# Patient Record
Sex: FEMALE | Race: WHITE | NOT HISPANIC OR LATINO | URBAN - METROPOLITAN AREA
[De-identification: names, ages, dates, MRNs, and addresses within clinical notes are randomized per-mention and may not be internally consistent; named-entity substitution may affect disease eponyms.]

---

## 2024-03-22 ENCOUNTER — HOSPITAL ENCOUNTER (EMERGENCY)
Facility: HOSPITAL | Age: 64
Discharge: HOME | End: 2024-03-22
Attending: GENERAL PRACTICE
Payer: MEDICARE

## 2024-03-22 VITALS
OXYGEN SATURATION: 98 % | DIASTOLIC BLOOD PRESSURE: 85 MMHG | SYSTOLIC BLOOD PRESSURE: 141 MMHG | RESPIRATION RATE: 18 BRPM | HEIGHT: 65 IN | TEMPERATURE: 98.7 F | HEART RATE: 68 BPM | BODY MASS INDEX: 36.99 KG/M2 | WEIGHT: 222 LBS

## 2024-03-22 DIAGNOSIS — H10.32 ACUTE CONJUNCTIVITIS OF LEFT EYE, UNSPECIFIED ACUTE CONJUNCTIVITIS TYPE: Primary | ICD-10-CM

## 2024-03-22 PROCEDURE — 2500000001 HC RX 250 WO HCPCS SELF ADMINISTERED DRUGS (ALT 637 FOR MEDICARE OP): Performed by: GENERAL PRACTICE

## 2024-03-22 PROCEDURE — 99283 EMERGENCY DEPT VISIT LOW MDM: CPT

## 2024-03-22 RX ORDER — IBUPROFEN 600 MG/1
600 TABLET ORAL ONCE
Status: COMPLETED | OUTPATIENT
Start: 2024-03-22 | End: 2024-03-22

## 2024-03-22 RX ORDER — TETRACAINE HYDROCHLORIDE 5 MG/ML
2 SOLUTION OPHTHALMIC ONCE
Status: COMPLETED | OUTPATIENT
Start: 2024-03-22 | End: 2024-03-22

## 2024-03-22 RX ORDER — ERYTHROMYCIN 5 MG/G
OINTMENT OPHTHALMIC EVERY 6 HOURS
Qty: 10 G | Refills: 0 | Status: SHIPPED | OUTPATIENT
Start: 2024-03-22 | End: 2024-03-27

## 2024-03-22 RX ADMIN — FLUORESCEIN SODIUM 1 STRIP: 1 STRIP OPHTHALMIC at 17:10

## 2024-03-22 RX ADMIN — IBUPROFEN 600 MG: 600 TABLET, FILM COATED ORAL at 17:56

## 2024-03-22 RX ADMIN — TETRACAINE HYDROCHLORIDE 2 DROP: 5 SOLUTION OPHTHALMIC at 17:10

## 2024-03-22 ASSESSMENT — COLUMBIA-SUICIDE SEVERITY RATING SCALE - C-SSRS
6. HAVE YOU EVER DONE ANYTHING, STARTED TO DO ANYTHING, OR PREPARED TO DO ANYTHING TO END YOUR LIFE?: NO
1. IN THE PAST MONTH, HAVE YOU WISHED YOU WERE DEAD OR WISHED YOU COULD GO TO SLEEP AND NOT WAKE UP?: NO
2. HAVE YOU ACTUALLY HAD ANY THOUGHTS OF KILLING YOURSELF?: NO

## 2024-03-22 ASSESSMENT — PAIN SCALES - GENERAL
PAINLEVEL_OUTOF10: 4
PAINLEVEL_OUTOF10: 0 - NO PAIN
PAINLEVEL_OUTOF10: 4

## 2024-03-22 ASSESSMENT — PAIN - FUNCTIONAL ASSESSMENT: PAIN_FUNCTIONAL_ASSESSMENT: 0-10

## 2024-03-22 NOTE — ED TRIAGE NOTES
PT TO ED FROM HOME WITH C/O LEFT EYE REDNESS, EDEMA AND DRAINAGE FOR 4 DAYS. PT SEEN AT [facility] AND GIVEN ABX DROPS WITH NO IMPROVEMENT.

## 2024-03-24 ENCOUNTER — HOSPITAL ENCOUNTER (EMERGENCY)
Facility: HOSPITAL | Age: 64
Discharge: HOME | End: 2024-03-24
Attending: EMERGENCY MEDICINE
Payer: MEDICARE

## 2024-03-24 VITALS
SYSTOLIC BLOOD PRESSURE: 140 MMHG | HEIGHT: 65 IN | HEART RATE: 82 BPM | DIASTOLIC BLOOD PRESSURE: 93 MMHG | WEIGHT: 222 LBS | RESPIRATION RATE: 18 BRPM | TEMPERATURE: 98.4 F | BODY MASS INDEX: 36.99 KG/M2 | OXYGEN SATURATION: 95 %

## 2024-03-24 DIAGNOSIS — B30.9 VIRAL CONJUNCTIVITIS OF BOTH EYES: Primary | ICD-10-CM

## 2024-03-24 PROCEDURE — 99284 EMERGENCY DEPT VISIT MOD MDM: CPT

## 2024-03-24 PROCEDURE — 2500000001 HC RX 250 WO HCPCS SELF ADMINISTERED DRUGS (ALT 637 FOR MEDICARE OP)

## 2024-03-24 PROCEDURE — 99285 EMERGENCY DEPT VISIT HI MDM: CPT

## 2024-03-24 RX ADMIN — FLUORESCEIN SODIUM 2 STRIP: 1 STRIP OPHTHALMIC at 15:20

## 2024-03-24 ASSESSMENT — PAIN - FUNCTIONAL ASSESSMENT: PAIN_FUNCTIONAL_ASSESSMENT: 0-10

## 2024-03-24 ASSESSMENT — COLUMBIA-SUICIDE SEVERITY RATING SCALE - C-SSRS
6. HAVE YOU EVER DONE ANYTHING, STARTED TO DO ANYTHING, OR PREPARED TO DO ANYTHING TO END YOUR LIFE?: NO
2. HAVE YOU ACTUALLY HAD ANY THOUGHTS OF KILLING YOURSELF?: NO
1. IN THE PAST MONTH, HAVE YOU WISHED YOU WERE DEAD OR WISHED YOU COULD GO TO SLEEP AND NOT WAKE UP?: NO

## 2024-03-24 ASSESSMENT — VISUAL ACUITY
OS: 20/40
OU: 1
OU: 20/20
OD: 20/30

## 2024-03-24 ASSESSMENT — PAIN SCALES - GENERAL: PAINLEVEL_OUTOF10: 5 - MODERATE PAIN

## 2024-03-24 ASSESSMENT — TONOMETRY
OD_IOP_MMHG: 17
OS_IOP_MMHG: 19

## 2024-03-24 NOTE — DISCHARGE INSTRUCTIONS
Continue using Polytrim drops and erythromycin as prescribed.    Apply artificial tears every 2 hours to both eyes.  May apply cool compresses.  Ophthalmology services will follow-up with scheduled appointment information.  Return to the emergency department if symptoms worsen or new symptoms presents.

## 2024-03-24 NOTE — ED PROVIDER NOTES
Emergency Department Encounter  [facility] EMERGENCY MEDICINE    Patient: Marie Ray  MRN: [id number]  : 1960  Date of Evaluation: 3/24/2024  ED Provider: BISHOP Akbar      Chief Complaint       Chief Complaint   Patient presents with    Eye Pain     [place]    (Location/Symptom, Timing/Onset, Context/Setting, Quality, Duration, Modifying Factors, Severity) Note limiting factors.   Limitations to History: None  Historian: Patient  Records reviewed: EMR inpatient and outpatient notes, Care Everywhere      Marie Ray is a 63 y.o. female who presents to the emergency department complaining of progressively worsening redness and pain to bilateral eyes.  She states on Monday symptoms occurred, started having redness of the left eye, the next day started having tearing and pain.  She was evaluated at an urgent care and prescribed Polytrim, after 2 days, symptoms worsen, she  was then evaluated at [facility] on Friday, and prescribed erythromycin and declined to be transferred to [facility] for ophthalmology consult.  She expresses she believes may be coming down with a cold.  She denies vision loss, changes in vision, foreign body, acute injury, purulent drainage.    ROS:     Review of Systems  14 systems reviewed and otherwise acutely negative except as in the [place].          Past History     Past Medical History:   Diagnosis Date    Personal history of other mental and behavioral disorders 2013    History of depression     Past Surgical History:   Procedure Laterality Date    APPENDECTOMY  2014    Appendectomy    CHOLECYSTECTOMY  2014    Cholecystectomy     Social History     Socioeconomic History    Marital status:      Spouse name: None    Number of children: None    Years of education: None    Highest education level: None   Occupational History    None   Tobacco Use    Smoking status: None    Smokeless tobacco: None   Substance and Sexual Activity    Alcohol use:  None    Drug use: None    Sexual activity: None   Other Topics Concern    None   Social History Narrative    None     Social Determinants of Health     Financial Resource Strain: Not on file   Food Insecurity: Not on file   Transportation Needs: Not on file   Physical Activity: Not on file   Stress: Not on file   Social Connections: Not on file   Intimate Partner Violence: Not on file   Housing Stability: Not on file       Medications/Allergies     Previous Medications    ERYTHROMYCIN (ROMYCIN) 5 MG/GRAM (0.5 %) OPHTHALMIC OINTMENT    Apply to left eye every 6 hours for 5 days. Apply Amount per Dose: 0.5 inch (~1 cm) per dose.     No Known Allergies     Physical Exam       ED Triage Vitals [03/24/24 1440]   Temperature Heart Rate Respirations BP   36.9 °C (98.4 °F) 82 18 (!) 140/93      Pulse Ox Temp Source Heart Rate Source Patient Position   95 % Temporal -- --      BP Location FiO2 (%)     -- 21 %         Physical Exam  Constitutional:       Appearance: Normal appearance.   HENT:      Head: Normocephalic. Left periorbital erythema present.   Eyes:      General: Lids are everted, no foreign bodies appreciated. Vision grossly intact. Gaze aligned appropriately. No visual field deficit.        Right eye: Discharge present.         Left eye: Discharge present.     Intraocular pressure: Right eye pressure is 17 mmHg. Left eye pressure is 19 mmHg.      Extraocular Movements: Extraocular movements intact.      Right eye: Normal extraocular motion and no nystagmus.      Left eye: Normal extraocular motion and no nystagmus.      Conjunctiva/sclera:      Right eye: Right conjunctiva is injected. No chemosis, exudate or hemorrhage.     Left eye: Left conjunctiva is injected. Chemosis and hemorrhage present. No exudate.       Comments: Mild preorbital erythema and mild swelling to the left eye.  Tearing, erythematous sclera to right eye.  No erythema or swelling to periorbital.    Tearing, erythematous sclera and reddened  mass to the lateral aspect of the left eye.   Cardiovascular:      Rate and Rhythm: Normal rate and regular rhythm.      Pulses: Normal pulses.      Heart sounds: Normal heart sounds.   Pulmonary:      Effort: Pulmonary effort is normal.      Breath sounds: Normal breath sounds.   Abdominal:      General: Abdomen is flat. Bowel sounds are normal.      Palpations: Abdomen is soft.   Musculoskeletal:         General: Normal range of motion.      Cervical back: Normal range of motion.   Skin:     General: Skin is warm and dry.   Neurological:      General: No focal deficit present.      Mental Status: She is oriented to person, place, and time.   Psychiatric:         Mood and Affect: Mood normal.         Behavior: Behavior normal. Behavior is cooperative.           Diagnostics   Labs:  Labs Reviewed - No data to display  Radiographs:  No orders to display       Procedures:       EKG: interpreted by this provider  Time:  Indication:  Rate:  Rhythm:  Axis:  Interval:  ST Segment:  Comparison to Prior:      Medical decision making   In brief, Marie Ray is a 63 y.o. female who presented to the emergency department for worsening redness and pain to bilateral eyes.    See history above. Vitals reviewed.  Upon examination there is Mild preorbital erythema and mild swelling to the left surrounding skin of eye. Tearing, injected sclera to right eye.  No erythema or swelling to periorbital. Tearing, hemorrhagic, chemosis and injected sclera and reddened mass to the lateral aspect of the left eye.  Visual acuity OU 20/40, OS 20/30, bilateral 20/20.  Tetracaine and fluorescein were instilled with resolution of the patient's pain. Was viewed under the slit lamp. There did not appear to be any dye uptake, ulcerations, abrasions, or dendrites. Negative Pia sign.  Differential diagnoses include:  1.  Globe rupture  2.  Foreign body  3.  Viral conjunctivitis  Ophthalmology consulted, evaluated patient and  "recommended:  #Hemorrhagic viral conjunctivitis OU  - Recommend continuation of polytrim, erythromycin as has been taking   - Recommend aggressive lubrication with preservative free artificial tears q2h both eyes   - Recommend cool compresses OU   - No signs of bacterial superinfection at this time  - Patient educated on condition and self limiting nature  - Strict hand washing precautions, patient educated that she is extremely contagious   - Will follow up 1-2 weeks to ensure symptoms resolving, opthalmology to arrange internally  I reviewed the case with Dr.Sarah Lyons and Dr. Valentine Karimi.  Patient educated to continue Polytrim drops, erythromycin and ophthalmic lubricant drops.  Patient educated ophthalmology services will follow-up with scheduled appointment information.    I discussed the differential, results and discharge plan with the patient . I emphasized the importance of follow-up with the physician I referred them to in the timeframe recommended. I explained reasons for the patient to return to the clinic. Questions were addressed. They understand return precautions and discharge instructions. The patient  expressed understanding.            Diagnoses as of 03/24/24 1750   Viral conjunctivitis of both eyes      Visit Vitals  BP (!) 140/93   Pulse 82   Temp 36.9 °C (98.4 °F) (Temporal)   Resp 18   Ht 1.651 m (5' 5\")   Wt 101 kg (222 lb)   SpO2 95%   BMI 36.94 kg/m²   BSA 2.15 m²       Medications   fluorescein 1 mg ophthalmic strip 2 strip (2 strips Both Eyes Given 3/24/24 1520)           Final Impression    Viral conjunctivitis of both eyes    DISPOSITION  Disposition: Discharge  Patient condition is: Stable    Comment: Please note this report has been produced using speech recognition software and may contain errors related to that system including errors in grammar, punctuation, and spelling, as well as words and phrases that may be inappropriate.  If there are any questions or concerns please " feel free to contact the dictating provider for clarification.    ANTONIA Akbar-CNP  [facility]  Emergency department     ANTONIA Akbar-CATALINA  03/24/24 0283

## 2024-03-24 NOTE — CONSULTS
Reason For Consult  Red eye both eyes    History Of Present Illness  Marie Ray is a 63 y.o. female presenting with 1 week history of red eye. Red eye started left eye last Monday, started as a pink color but over the days has worsened to be darker red. Some irritation, FBS, tearing of that eye as well. Went to urgent care Wednesday and received polytrime drops which did not help. Presented to [facility] ED Friday after failure of symptoms to improve and erythromycin kaylan was added. Symptoms have continued despite these medications and now the right eye is starting to be pink in color and associated with some tearing. Patient endorses URI symptoms around the same period of time but denies trauma, purulent discharge. Patient denies floaters/flashes/diplopia or loss of vision.     No ocular history prior to this.      Past Medical History  She has a past medical history of Personal history of other mental and behavioral disorders (04/04/2013).    Physical Exam  Base Eye Exam       Visual Acuity (Snellen - Linear)         Right Left    Dist cc 20/20 20/20              Tonometry (Tonopen, 5:04 PM)         Right Left    Pressure 17 19              Pupils         Pupils Dark Light Shape React APD    Right PERRL, No APD 4 2 Round Brisk None    Left PERRL, No APD 4 2 Round Brisk None              Visual Fields (Counting fingers)         Left Right     Full Full              Extraocular Movement         Right Left     Full Full              Neuro/Psych       Oriented x3: Yes                  Additional Tests       Color         Right Left    Ish[place]ara 11/11 11/11                  Slit Lamp and Fundus Exam       External Exam         Right Left    External Normal Normal              Slit Lamp Exam         Right Left    Lids/Lashes Normal Inferior lid ecchymosis, medial synechiae lower lid    Conjunctiva/Sclera 1-2+ injection inferior 180 hemorrhagic chemosis, 2-3+ injection, no pseudomembranes    Cornea 2-3+ diffuse SPK 2-3+  "diffuse SPK    Anterior Chamber Deep and quiet Deep and quiet    Iris Round and reactive Round and reactive    Lens 1+ NS 1+ NS    Anterior Vitreous Normal Normal              Fundus Exam         Right Left    Disc Normal Normal    C/D Ratio 0.35 0.35    Macula Normal Normal    Vessels Normal Normal    Periphery Normal Normal                     Last Recorded Vitals  Blood pressure (!) 140/93, pulse 82, temperature 36.9 °C (98.4 °F), temperature source Temporal, resp. rate 18, height 1.651 m (5' 5\"), weight 101 kg (222 lb), SpO2 95 %.       Assessment/Plan   This is a 63F with bilateral viral (OS hemorrhagic) conjunctivitis failing polytrim/erythomycin treatment. This is likely d/t a viral infection which has now progressed to involve both eyes. Surrounding periorbital irrtation likely mechanical in nature in the setting of constant wiping of tears, less likely preseptal cellulitis. Recommend treatment supportively at this time. Patient educated and understanding.    #Hemorrhagic viral conjunctivitis OU  - Recommend continuation of polytrim, erythromycin as has been taking   - Recommend aggressive lubrication with preservative free artificial tears q2h both eyes   - Recommend cool compresses OU   - No signs of bacterial superinfection at this time  - Patient educated on condition and self limiting nature  - Strict hand washing precautions, patient educated that she is extremely contagious   - Will follow up 1-2 weeks to ensure symptoms resolving, opthalmology to arrange internally      Genaro Damon MD  PGY2 Ophthalmology     Ophthalmology Adult Pager - [phone]  Ophthalmology Pediatrics Pager - [phone]    For adult follow-up appointments, call: [phone]  For pediatric follow-up appointments, call: [phone]      NOTE: This note is not finalized until attending reviews and signs.              "

## 2024-03-24 NOTE — ED TRIAGE NOTES
Pt to ED c/o L eye irritation, tearing and itching starting on 3/19. Pt states she was given eye drops but is noting no improvement. She went to [facility] ED on Friday and express care at [facility] x2 last week for same compaints. States pain and swelling of L eye is worsening and now spreading to R eye.

## 2024-03-25 NOTE — ED PROVIDER NOTES
HPI   Chief Complaint   Patient presents with    Eye Problem       HPI: 63-year-old female presenting with left eye redness and clear discharge.  Her symptoms have been worsening over the past several days.  She does report URI symptoms.  She was seen in an urgent care several days ago and was prescribed polymyxin drops.  She reports mild decreased vision in the left eye and reports that she feels as if there is a film over it.  No change in vision of the right eye.  She does not wear contact lenses.  She denies any purulent discharge or pain with eye movement      Limitations to history: None  Independent Historians: Patient  External Records Reviewed: HIE, outpatient notes, inpatient notes  ------------------------------------------------------------------------------------------------------------------------------------------  ROS: a ten point review of systems was performed and was negative except as per HPI.  ------------------------------------------------------------------------------------------------------------------------------------------  PMH / PSH: as per HPI, otherwise reviewed in EMR  MEDS: as per HPI, otherwise reviewed in EMR  ALLERGIES: as per HPI, otherwise reviewed in EMR  SocH:  as per HPI, otherwise reviewed in EMR  FH:  as per HPI, otherwise reviewed in EMR  ------------------------------------------------------------------------------------------------------------------------------------------  Physical Exam:  VS: As documented in the triage note and EMR flowsheet from this visit was reviewed  General: Well appearing. No acute distress.   Eyes:  Extraocular movements grossly intact. No scleral icterus.  The conjunctiva is injected in the left eye with clear discharge there is chemosis present.  No purulent discharge.  PERRL  HEENT:  Normocephalic.  Atraumatic  Neck: Moves neck freely. No gross masses  CV: Regular rhythm. No murmurs, rubs or gallops   Resp: Clear to auscultation bilaterally.  No respiratory distress.    GI: Soft, no masses, nontender. No rebound tenderness or guarding  MSK: Symmetric muscle bulk. No deformities. No lower extremity edema.    Skin: Warm, dry, intact.   Neuro: No focal deficits.  A&O x3.   Psych: Appropriate for situation  ------------------------------------------------------------------------------------------------------------------------------------------  Hospital Course / Medical Decision Making:  Independent Interpretations: N/A  EKG as interpreted by me: N/A    MDM: This is a 63-year-old female presenting with left eye redness and clear discharge.  No purulent discharge noted.  I numbed the patient's eye with tetracaine and stained her eye with fluorescein and noted no corneal abrasion.  No dendritic lesions.  Intraocular pressure of 20.  I spoke with the chief resident on-call from the ophthalmology service at [facility] who agreed that the patient's symptoms are most likely viral in nature.  She asked that I provide erythromycin ointment.  This prescription was given to the patient.  She was also given an ophthalmology referral.  She is happy with this plan.  She will return to the ED for any concerning symptoms and will follow-up with ophthalmology as soon as possible.    Discussion of Management with Other Providers:   I discussed the patient/results with: Emergency medicine team    Final diagnosis and disposition as below.                            [place] Coma Scale Score: 15                     Patient History   Past Medical History:   Diagnosis Date    Personal history of other mental and behavioral disorders 04/04/2013    History of depression     Past Surgical History:   Procedure Laterality Date    APPENDECTOMY  04/02/2014    Appendectomy    CHOLECYSTECTOMY  04/02/2014    Cholecystectomy     No family history on file.  Social History     Tobacco Use    Smoking status: Not on file    Smokeless tobacco: Not on file   Substance Use Topics    Alcohol use: Not on  file    Drug use: Not on file       Physical Exam   ED Triage Vitals   Temperature Heart Rate Respirations BP   03/22/24 1536 03/22/24 1536 03/22/24 1536 03/22/24 1536   37.1 °C (98.7 °F) 76 17 (!) 152/94      Pulse Ox Temp src Heart Rate Source Patient Position   03/22/24 1536 -- 03/22/24 1743 --   96 %  Monitor       BP Location FiO2 (%)     -- --             Physical Exam    ED Course & MDM   Diagnoses as of 03/25/24 1432   Acute conjunctivitis of left eye, unspecified acute conjunctivitis type       Medical Decision Making      Procedure  Procedures     Denver Gomez,   03/25/24 8222

## 2024-04-03 ENCOUNTER — OFFICE VISIT (OUTPATIENT)
Dept: OPHTHALMOLOGY | Facility: CLINIC | Age: 64
End: 2024-04-03
Payer: MEDICARE

## 2024-04-03 DIAGNOSIS — H16.143 SUPERFICIAL PUNCTATE KERATITIS OF BOTH EYES: ICD-10-CM

## 2024-04-03 DIAGNOSIS — H53.8 BLURRED VISION: ICD-10-CM

## 2024-04-03 DIAGNOSIS — B30.9 ACUTE VIRAL CONJUNCTIVITIS OF BOTH EYES: Primary | ICD-10-CM

## 2024-04-03 DIAGNOSIS — H53.143 PHOTOPHOBIA OF BOTH EYES: ICD-10-CM

## 2024-04-03 PROCEDURE — 92004 COMPRE OPH EXAM NEW PT 1/>: CPT | Performed by: OPHTHALMOLOGY

## 2024-04-03 RX ORDER — TOBRAMYCIN AND DEXAMETHASONE 3; 1 MG/ML; MG/ML
1 SUSPENSION/ DROPS OPHTHALMIC 4 TIMES DAILY
Qty: 5 ML | Refills: 1 | Status: SHIPPED | OUTPATIENT
Start: 2024-04-03 | End: 2024-04-13

## 2024-04-03 ASSESSMENT — REFRACTION_MANIFEST
OS_AXIS: 170
OS_SPHERE: +1.50
OS_ADD: +2.50
OD_CYLINDER: -0.75
OD_SPHERE: +1.00
OD_ADD: +2.50
OD_AXIS: 170
OS_CYLINDER: -0.75

## 2024-04-03 ASSESSMENT — REFRACTION_WEARINGRX
OS_AXIS: 170
OD_CYLINDER: -0.50
OS_SPHERE: +1.00
OS_ADD: +2.50
OS_CYLINDER: -0.50
OD_ADD: +2.50
OD_SPHERE: +1.50
OD_AXIS: 170

## 2024-04-03 ASSESSMENT — SLIT LAMP EXAM - LIDS: COMMENTS: NORMAL

## 2024-04-03 ASSESSMENT — VISUAL ACUITY
OD_CC: 20/50-2
OD_PH_CC: 20/40
OS_CC: 20/70
OS_PH_CC: 20/60
METHOD: SNELLEN - LINEAR

## 2024-04-03 ASSESSMENT — TONOMETRY
OD_IOP_MMHG: 18
OS_IOP_MMHG: 20
IOP_METHOD: GOLDMANN APPLANATION

## 2024-04-03 ASSESSMENT — CUP TO DISC RATIO
OD_RATIO: 0.35
OS_RATIO: 0.35

## 2024-04-03 ASSESSMENT — EXTERNAL EXAM - RIGHT EYE: OD_EXAM: NORMAL

## 2024-04-03 ASSESSMENT — EXTERNAL EXAM - LEFT EYE: OS_EXAM: NORMAL

## 2024-04-03 NOTE — PROGRESS NOTES
Assessment/Plan   Diagnoses and all orders for this visit:  Acute viral conjunctivitis of both eyes  -     tobramycin-dexamethasone (Tobradex) ophthalmic suspension; Administer 1 drop into both eyes 4 times a day for 10 days.  -cool compresses both eyes qid for 30 minutes  -pt was advised this is usually a self limited condition but may have symptoms for a while    Superficial punctate keratitis of both eyes  .swich to Preservative free tears both eyes q2-3 hours    Blurred vision  continue to monitor  --pt was advises she is infectious and to stay away from close contacts with others     Photophobia of both eyes  continue to monitor    Refer to PCP for Flue like symptoms and sore throat.    Return in  10  days  for follow up or sooner if having any problems

## 2024-04-16 ENCOUNTER — OFFICE VISIT (OUTPATIENT)
Dept: OPHTHALMOLOGY | Facility: CLINIC | Age: 64
End: 2024-04-16
Payer: MEDICARE

## 2024-04-16 DIAGNOSIS — H16.143 SUPERFICIAL PUNCTATE KERATITIS OF BOTH EYES: ICD-10-CM

## 2024-04-16 DIAGNOSIS — H53.143 PHOTOPHOBIA OF BOTH EYES: ICD-10-CM

## 2024-04-16 DIAGNOSIS — B30.9 ACUTE VIRAL CONJUNCTIVITIS OF BOTH EYES: Primary | ICD-10-CM

## 2024-04-16 PROCEDURE — 92012 INTRM OPH EXAM EST PATIENT: CPT | Performed by: OPHTHALMOLOGY

## 2024-04-16 ASSESSMENT — EXTERNAL EXAM - LEFT EYE: OS_EXAM: NORMAL

## 2024-04-16 ASSESSMENT — TONOMETRY
OS_IOP_MMHG: 16
OD_IOP_MMHG: 16
IOP_METHOD: GOLDMANN APPLANATION

## 2024-04-16 ASSESSMENT — EXTERNAL EXAM - RIGHT EYE: OD_EXAM: NORMAL

## 2024-04-16 ASSESSMENT — VISUAL ACUITY
OS_CC: 20/20
OD_CC: 20/25
METHOD: SNELLEN - LINEAR

## 2024-04-16 ASSESSMENT — SLIT LAMP EXAM - LIDS
COMMENTS: NORMAL
COMMENTS: NORMAL

## 2024-04-16 NOTE — PROGRESS NOTES
Assessment/Plan   Diagnoses and all orders for this visit:  Acute viral conjunctivitis of both eyes  -much better  -continue with Systane drops qid  -continue with cool compresses both eyes qid    Superficial punctate keratitis of both eyes  Improving  continue to monitor    Photophobia of both eyes  continue to monitor    Return in   1   month(s) for follow up or sooner if having any problems

## 2024-05-09 ENCOUNTER — OFFICE VISIT (OUTPATIENT)
Dept: OPHTHALMOLOGY | Facility: CLINIC | Age: 64
End: 2024-05-09
Payer: MEDICARE

## 2024-05-09 DIAGNOSIS — H17.9 CORNEAL OPACITY: ICD-10-CM

## 2024-05-09 DIAGNOSIS — B30.9 ACUTE VIRAL CONJUNCTIVITIS OF BOTH EYES: Primary | ICD-10-CM

## 2024-05-09 PROCEDURE — 92012 INTRM OPH EXAM EST PATIENT: CPT | Performed by: OPHTHALMOLOGY

## 2024-05-09 ASSESSMENT — EXTERNAL EXAM - LEFT EYE: OS_EXAM: NORMAL

## 2024-05-09 ASSESSMENT — TONOMETRY
IOP_METHOD: GOLDMANN APPLANATION
OS_IOP_MMHG: 15
OD_IOP_MMHG: 17

## 2024-05-09 ASSESSMENT — SLIT LAMP EXAM - LIDS
COMMENTS: NORMAL
COMMENTS: NORMAL

## 2024-05-09 ASSESSMENT — VISUAL ACUITY
OS_CC: 20/20
METHOD: SNELLEN - LINEAR
OD_CC: 20/20

## 2024-05-09 ASSESSMENT — EXTERNAL EXAM - RIGHT EYE: OD_EXAM: NORMAL

## 2024-05-09 NOTE — PROGRESS NOTES
Assessment/Plan   Diagnoses and all orders for this visit:  Acute viral conjunctivitis of both eyes  -much improved    Corneal opacities left eye worse than right    -continue with Preservative free tears both eyes q2-3 hrs    Return in 1  month for follow up or sooner if having any problems

## 2024-05-15 ENCOUNTER — APPOINTMENT (OUTPATIENT)
Dept: OPHTHALMOLOGY | Facility: CLINIC | Age: 64
End: 2024-05-15
Payer: MEDICARE

## 2024-06-18 ENCOUNTER — APPOINTMENT (OUTPATIENT)
Dept: OPHTHALMOLOGY | Facility: CLINIC | Age: 64
End: 2024-06-18
Payer: MEDICARE

## 2024-06-19 ENCOUNTER — OFFICE VISIT (OUTPATIENT)
Dept: OPHTHALMOLOGY | Facility: CLINIC | Age: 64
End: 2024-06-19
Payer: MEDICARE

## 2024-06-19 DIAGNOSIS — H25.13 AGE-RELATED NUCLEAR CATARACT OF BOTH EYES: ICD-10-CM

## 2024-06-19 DIAGNOSIS — H17.9 CORNEAL OPACITY: Primary | ICD-10-CM

## 2024-06-19 DIAGNOSIS — H43.393 VITREOUS FLOATERS OF BOTH EYES: ICD-10-CM

## 2024-06-19 PROCEDURE — 92014 COMPRE OPH EXAM EST PT 1/>: CPT | Performed by: OPHTHALMOLOGY

## 2024-06-19 ASSESSMENT — VISUAL ACUITY
METHOD: SNELLEN - LINEAR
OS_CC: 20/20
OD_CC: 20/20

## 2024-06-19 ASSESSMENT — CUP TO DISC RATIO
OS_RATIO: 0.3
OD_RATIO: 0.3

## 2024-06-19 ASSESSMENT — ENCOUNTER SYMPTOMS
ENDOCRINE NEGATIVE: 0
RESPIRATORY NEGATIVE: 0
EYES NEGATIVE: 1
ALLERGIC/IMMUNOLOGIC NEGATIVE: 0
CONSTITUTIONAL NEGATIVE: 0
MUSCULOSKELETAL NEGATIVE: 0
CARDIOVASCULAR NEGATIVE: 0
NEUROLOGICAL NEGATIVE: 0
PSYCHIATRIC NEGATIVE: 0
GASTROINTESTINAL NEGATIVE: 0
HEMATOLOGIC/LYMPHATIC NEGATIVE: 0

## 2024-06-19 ASSESSMENT — TONOMETRY
OD_IOP_MMHG: 14
IOP_METHOD: GOLDMANN APPLANATION
OS_IOP_MMHG: 13

## 2024-06-19 ASSESSMENT — EXTERNAL EXAM - RIGHT EYE: OD_EXAM: NORMAL

## 2024-06-19 ASSESSMENT — EXTERNAL EXAM - LEFT EYE: OS_EXAM: NORMAL

## 2024-06-19 ASSESSMENT — SLIT LAMP EXAM - LIDS
COMMENTS: NORMAL
COMMENTS: NORMAL

## 2024-06-19 NOTE — PROGRESS NOTES
Assessment/Plan   Diagnoses and all orders for this visit:  Corneal opacity  Subepithelial infiltrates both eyes after Adenovirus infection  -discussed the use of topical steroids to decrease the visual disturbances the patient is experiencing.  Advised pt that the infiltrates may return after the steroids are stopped, as well as some of the side effects of steroid use.  Pt declined steroid therapy at this time  -continue with PF artificial tears both eyes q2-3 hours     Age-related nuclear cataract of both eyes  Not visually significant at the present time    Vitreous floaters of both eyes  -pt was advised to call stat if experiencing increased floaters, flashes, curtains in front of eyes, decreased vision, blurry vision    Return in  3 month(s) for follow up or sooner if having any problems  Refract at next visit

## 2024-09-25 ENCOUNTER — APPOINTMENT (OUTPATIENT)
Dept: OPHTHALMOLOGY | Facility: CLINIC | Age: 64
End: 2024-09-25
Payer: MEDICARE

## 2024-09-25 DIAGNOSIS — H17.9 CORNEAL OPACITY: ICD-10-CM

## 2024-09-25 DIAGNOSIS — H52.4 PRESBYOPIA: ICD-10-CM

## 2024-09-25 DIAGNOSIS — H52.03 HYPEROPIA OF BOTH EYES: ICD-10-CM

## 2024-09-25 DIAGNOSIS — H53.8 BLURRED VISION: Primary | ICD-10-CM

## 2024-09-25 DIAGNOSIS — H52.223 REGULAR ASTIGMATISM OF BOTH EYES: ICD-10-CM

## 2024-09-25 DIAGNOSIS — H25.13 AGE-RELATED NUCLEAR CATARACT OF BOTH EYES: ICD-10-CM

## 2024-09-25 PROCEDURE — 92012 INTRM OPH EXAM EST PATIENT: CPT | Performed by: OPHTHALMOLOGY

## 2024-09-25 ASSESSMENT — SLIT LAMP EXAM - LIDS
COMMENTS: NORMAL
COMMENTS: NORMAL

## 2024-09-25 ASSESSMENT — REFRACTION_WEARINGRX
OS_AXIS: 170
OD_AXIS: 170
OD_CYLINDER: -0.50
OD_ADD: +2.50
OD_SPHERE: +1.50
OS_SPHERE: +1.00
OS_ADD: +2.50
OS_CYLINDER: -0.75

## 2024-09-25 ASSESSMENT — VISUAL ACUITY
OS_CC: 20/30-2
METHOD: SNELLEN - LINEAR
OD_CC: 20/20-1

## 2024-09-25 ASSESSMENT — EXTERNAL EXAM - RIGHT EYE: OD_EXAM: NORMAL

## 2024-09-25 ASSESSMENT — EXTERNAL EXAM - LEFT EYE: OS_EXAM: NORMAL

## 2024-09-25 ASSESSMENT — TONOMETRY
IOP_METHOD: GOLDMANN APPLANATION
OS_IOP_MMHG: 20
OD_IOP_MMHG: 22

## 2024-09-25 ASSESSMENT — REFRACTION_MANIFEST
OS_CYLINDER: -0.25
OS_AXIS: 170
OD_AXIS: 170
OS_ADD: +2.50
OD_ADD: +2.50
OD_CYLINDER: -0.25
OD_SPHERE: +1.50
OS_SPHERE: +1.25

## 2024-09-25 ASSESSMENT — ENCOUNTER SYMPTOMS: EYES NEGATIVE: 1

## 2024-09-25 NOTE — PROGRESS NOTES
Assessment/Plan   Assessment/Plan   Diagnoses and all orders for this visit:  Blurred vision  Improved with refraction    Corneal opacity  Status post (s/p) adenoviral kertoconjunctivitis  -Start artificial tears both eyes (OU) qid  -stress compliance    Age-related nuclear cataract of both eyes  continue to monitor    Hyperopia of both eyes  Regular astigmatism of both eyes  Presbyopia  Refractive error  -give Rx for new glasses    Return for a dilated exam in  6   months or sooner if having any problems         [Obese, well nourished, in no acute distress] : obese, well nourished, in no acute distress [Normal] : affect appropriate [de-identified] : normoactive bowel sounds, soft and non tender, no hepatosplenomegaly or masses appreciated.  no swelling / no erythema / no tenderness along port site on exam.

## 2024-11-12 ENCOUNTER — OFFICE VISIT (OUTPATIENT)
Dept: GASTROENTEROLOGY | Facility: HOSPITAL | Age: 64
End: 2024-11-12
Payer: MEDICARE

## 2024-11-12 VITALS — WEIGHT: 220 LBS | BODY MASS INDEX: 36.65 KG/M2 | HEIGHT: 65 IN

## 2024-11-12 DIAGNOSIS — K21.9 GASTROESOPHAGEAL REFLUX DISEASE WITHOUT ESOPHAGITIS: ICD-10-CM

## 2024-11-12 DIAGNOSIS — K64.8 HEMORRHOID PROLAPSE: ICD-10-CM

## 2024-11-12 DIAGNOSIS — K62.5 RECTAL BLEEDING: Primary | ICD-10-CM

## 2024-11-12 PROCEDURE — 3008F BODY MASS INDEX DOCD: CPT | Performed by: INTERNAL MEDICINE

## 2024-11-12 PROCEDURE — 99204 OFFICE O/P NEW MOD 45 MIN: CPT | Performed by: INTERNAL MEDICINE

## 2024-11-12 PROCEDURE — 99214 OFFICE O/P EST MOD 30 MIN: CPT | Performed by: INTERNAL MEDICINE

## 2024-11-12 RX ORDER — SODIUM, POTASSIUM,MAG SULFATES 17.5-3.13G
SOLUTION, RECONSTITUTED, ORAL ORAL
Qty: 1 EACH | Refills: 0 | Status: SHIPPED | OUTPATIENT
Start: 2024-11-12

## 2024-11-12 RX ORDER — OMEPRAZOLE 40 MG/1
CAPSULE, DELAYED RELEASE ORAL
COMMUNITY
Start: 2024-09-01

## 2024-11-12 RX ORDER — BUTALBITAL, ASPIRIN, AND CAFFEINE 325; 50; 40 MG/1; MG/1; MG/1
CAPSULE ORAL
COMMUNITY
Start: 2024-09-03

## 2024-11-12 RX ORDER — PAROXETINE 30 MG/1
TABLET, FILM COATED ORAL
COMMUNITY
Start: 2024-09-01

## 2024-11-12 ASSESSMENT — ENCOUNTER SYMPTOMS
RECTAL PAIN: 1
CONSTITUTIONAL NEGATIVE: 1
HEMATOLOGIC/LYMPHATIC NEGATIVE: 1
ENDOCRINE NEGATIVE: 1
NEUROLOGICAL NEGATIVE: 1
MUSCULOSKELETAL NEGATIVE: 1
ALLERGIC/IMMUNOLOGIC NEGATIVE: 1
RESPIRATORY NEGATIVE: 1
BLOOD IN STOOL: 1
CARDIOVASCULAR NEGATIVE: 1
PSYCHIATRIC NEGATIVE: 1
EYES NEGATIVE: 1

## 2024-11-12 NOTE — ASSESSMENT & PLAN NOTE
Patient had upper endoscopy for chronic acid reflux in the setting of a hiatal hernia approximately 2 years ago.  Biopsies of the stomach showed no H. pylori.  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  At this point I do not think upper endoscopy changes management.  We will continue her proton pump inhibitor.

## 2024-11-12 NOTE — PROGRESS NOTES
Chronic acid reflux  Hiatal hernia  Rectal pain and rectal bleeding with internal hemorrhoids    History of Present Illness:   Marie Ray is a 64 y.o. female with PMHx of anxiety, gastroesophageal reflux, hiatal hernia, and history of polyps and who presents to GI clinic for follow up.  She was last seen remotely as [place] [facility] [place]  She denies any fever, chills or severe abdominal pain.  Her main complaint is severe rectal pain, spasm and bleeding.  She had colonoscopy 2 years ago and I have the pictures but I do not have the full report.  She was told she needed a repeat exam.  It is unclear if she had polyps at that time.  She did have upper endoscopy that showed a hiatal hernia, esophagitis and biopsies that were negative for H. pylori.  Review of her records on her phone also show biopsies of the esophagus 2 years prior not showing Nova's esophagus.  She has no other complaints but her main complaint is bleeding and anal symptoms.    Her past medical history is reviewed    Her medications are reviewed    I did review her 2015 colonoscopy.    Review of Systems  Review of Systems   Constitutional: Negative.    HENT: Negative.     Eyes: Negative.    Respiratory: Negative.     Cardiovascular: Negative.    Gastrointestinal:  Positive for blood in stool and rectal pain.   Endocrine: Negative.    Genitourinary: Negative.    Musculoskeletal: Negative.    Skin: Negative.    Allergic/Immunologic: Negative.    Neurological: Negative.    Hematological: Negative.    Psychiatric/Behavioral: Negative.     All other systems reviewed and are negative.      Allergies  No Known Allergies    Medications  Current Outpatient Medications   Medication Instructions    butalbital-aspirin-caffeine (Fiorinal) -40 mg capsule TAKE 1 CAPSULE BY MOUTH 4 TIMES DAILY AS NEEDED FOR UP  DAYS.    omeprazole (PriLOSEC) 40 mg DR capsule     PARoxetine (Paxil) 30 mg tablet         Objective    There were no vitals taken for this visit.   Physical Exam  Constitutional:       Appearance: Normal appearance.   Eyes:      Conjunctiva/sclera: Conjunctivae normal.   Cardiovascular:      Rate and Rhythm: Normal rate and regular rhythm.   Pulmonary:      Effort: Pulmonary effort is normal.      Breath sounds: Normal breath sounds.   Abdominal:      General: Abdomen is flat. Bowel sounds are normal.      Palpations: Abdomen is soft.   Musculoskeletal:         General: Normal range of motion.      Cervical back: Normal range of motion.   Neurological:      Mental Status: She is alert.           Lab Results   Component Value Date    WBC 5.4 08/28/2018    HGB 13.6 08/28/2018    HCT 42.6 08/28/2018     08/28/2018     Lab Results   Component Value Date     08/28/2018    K 4.2 08/28/2018     08/28/2018    CO2 29 08/28/2018    BUN 14 08/28/2018    CREATININE 0.87 08/28/2018    CALCIUM 10.1 08/28/2018    PROT 7.2 08/28/2018    BILITOT 0.4 08/28/2018    ALKPHOS 79 08/28/2018    ALT 18 08/28/2018    AST 19 08/28/2018    GLUCOSE 107 (H) 08/28/2018           Marie Ray is a 64 y.o. female who presents to GI clinic for rectal bleeding, rectal pain and history of internal hemorrhoids.    Rectal bleeding  Patient is a 64-year-old who have seen in the past with anxiety and who had her care at [facility].  She had colonoscopy in 2022 and I have the photographs which I reviewed which show significant hemorrhoids.  She has had rectal pain, diarrhea and prolapsing of which she thinks is an internal hemorrhoid.  She was told at [facility] she needed repeat colonoscopy.  We will schedule.  She is also had a history of polyps.  I discussed the procedure, sedation recovery.  We will also consider hemorrhoid banding at the time of the procedure.  All her questions are answered and we will schedule.    Hemorrhoid prolapse  See above for discussion on possible hemorrhoid banding as well.    Gastroesophageal  reflux disease without esophagitis  Patient had upper endoscopy for chronic acid reflux in the setting of a hiatal hernia approximately 2 years ago.  Biopsies of the stomach showed no H. pylori.  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  At this point I do not think upper endoscopy changes management.  We will continue her proton pump inhibitor.       Hayden Mata MD

## 2024-11-12 NOTE — ASSESSMENT & PLAN NOTE
Patient is a 64-year-old who have seen in the past with anxiety and who had her care at [facility].  She had colonoscopy in 2022 and I have the photographs which I reviewed which show significant hemorrhoids.  She has had rectal pain, diarrhea and prolapsing of which she thinks is an internal hemorrhoid.  She was told at [facility] she needed repeat colonoscopy.  We will schedule.  She is also had a history of polyps.  I discussed the procedure, sedation recovery.  We will also consider hemorrhoid banding at the time of the procedure.  All her questions are answered and we will schedule.

## 2025-01-13 ENCOUNTER — APPOINTMENT (OUTPATIENT)
Dept: GASTROENTEROLOGY | Facility: EXTERNAL LOCATION | Age: 65
End: 2025-01-13
Payer: MEDICARE

## 2025-01-13 DIAGNOSIS — Z12.11 SCREEN FOR COLON CANCER: Primary | ICD-10-CM

## 2025-01-13 DIAGNOSIS — K64.8 HEMORRHOID PROLAPSE: ICD-10-CM

## 2025-01-13 DIAGNOSIS — K62.5 RECTAL BLEEDING: ICD-10-CM

## 2025-01-13 DIAGNOSIS — Z86.0100 HISTORY OF COLONIC POLYPS: ICD-10-CM

## 2025-01-13 DIAGNOSIS — K64.1 BLEEDING GRADE II HEMORRHOIDS: ICD-10-CM

## 2025-01-13 PROCEDURE — 46221 LIGATION OF HEMORRHOID(S): CPT | Performed by: INTERNAL MEDICINE

## 2025-01-13 PROCEDURE — G0105 COLORECTAL SCRN; HI RISK IND: HCPCS | Performed by: INTERNAL MEDICINE

## 2025-01-13 NOTE — PROGRESS NOTES
Colonoscopy for history of polyps showed no polyps and I recommend repeat 5 years.  She had a grade 2 prolapsing internal hemorrhoid in the question banding which was done.  She tolerated this well.  I recommend she maximize his fiber in her diet and avoid straining.

## 2025-03-26 ENCOUNTER — APPOINTMENT (OUTPATIENT)
Dept: OPHTHALMOLOGY | Facility: CLINIC | Age: 65
End: 2025-03-26
Payer: MEDICARE

## 2025-03-26 DIAGNOSIS — Z87.2 HISTORY OF ECZEMA: ICD-10-CM

## 2025-03-26 DIAGNOSIS — H52.4 PRESBYOPIA: ICD-10-CM

## 2025-03-26 DIAGNOSIS — H04.123 DRY EYES: ICD-10-CM

## 2025-03-26 DIAGNOSIS — H52.03 HYPEROPIA OF BOTH EYES: ICD-10-CM

## 2025-03-26 DIAGNOSIS — H25.13 AGE-RELATED NUCLEAR CATARACT OF BOTH EYES: ICD-10-CM

## 2025-03-26 DIAGNOSIS — H52.223 REGULAR ASTIGMATISM OF BOTH EYES: ICD-10-CM

## 2025-03-26 DIAGNOSIS — H17.9 CORNEAL OPACITY: ICD-10-CM

## 2025-03-26 DIAGNOSIS — H53.8 BLURRED VISION: ICD-10-CM

## 2025-03-26 DIAGNOSIS — H01.02B SQUAMOUS BLEPHARITIS OF BOTH UPPER AND LOWER EYELID OF LEFT EYE: Primary | ICD-10-CM

## 2025-03-26 PROCEDURE — 92014 COMPRE OPH EXAM EST PT 1/>: CPT | Performed by: OPHTHALMOLOGY

## 2025-03-26 PROCEDURE — 92015 DETERMINE REFRACTIVE STATE: CPT | Performed by: OPHTHALMOLOGY

## 2025-03-26 RX ORDER — NEOMYCIN SULFATE, POLYMYXIN B SULFATE, AND DEXAMETHASONE 3.5; 10000; 1 MG/G; [USP'U]/G; MG/G
OINTMENT OPHTHALMIC
Qty: 3.5 G | Refills: 0 | Status: SHIPPED | OUTPATIENT
Start: 2025-03-26

## 2025-03-26 ASSESSMENT — SLIT LAMP EXAM - LIDS
COMMENTS: NORMAL
COMMENTS: NORMAL

## 2025-03-26 ASSESSMENT — ENCOUNTER SYMPTOMS: EYES NEGATIVE: 1

## 2025-03-26 ASSESSMENT — VISUAL ACUITY
METHOD: SNELLEN - LINEAR
OD_CC: 20/20
OS_CC: 20/40

## 2025-03-26 ASSESSMENT — REFRACTION_WEARINGRX
OD_ADD: +2.50
OS_ADD: +2.50
OS_SPHERE: +1.50
OD_AXIS: 170
OS_AXIS: 170
OD_CYLINDER: -0.50
OD_SPHERE: +1.50
OS_CYLINDER: -0.50

## 2025-03-26 ASSESSMENT — REFRACTION_MANIFEST
OS_CYLINDER: -0.75
OD_SPHERE: +1.50
OD_CYLINDER: -0.25
OS_SPHERE: +2.00
OD_AXIS: 170
OD_ADD: +3.00
OS_AXIS: 080
OS_ADD: +3.00

## 2025-03-26 ASSESSMENT — EXTERNAL EXAM - RIGHT EYE: OD_EXAM: NORMAL

## 2025-03-26 ASSESSMENT — CUP TO DISC RATIO
OS_RATIO: 0.3
OD_RATIO: 0.3

## 2025-03-26 ASSESSMENT — TONOMETRY
IOP_METHOD: TONOPEN
OD_IOP_MMHG: 19
OS_IOP_MMHG: 19

## 2025-03-26 ASSESSMENT — EXTERNAL EXAM - LEFT EYE: OS_EXAM: NORMAL

## 2025-03-26 NOTE — PROGRESS NOTES
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 Chang for evaluation and management    Age-related nuclear cataract of both eyes  Not visually significant at the present time  continue to monitor    Blurred vision  Improved with refraction    Dry eyes  -continue with preservative free  artificial tears both eyes (OU) qid  -stress compliance  -discussed the use of gel lubricating drops as well as lubricating ophthalmic ointment at bedtime    History of eczema    Hyperopia of both eyes  Regular astigmatism of both eyes  Presbyopia  Refractive error  -give Rx for new glasses    Return in 3  month(s) for follow up or sooner if having any problems

## 2025-04-15 ENCOUNTER — APPOINTMENT (OUTPATIENT)
Dept: OPHTHALMOLOGY | Facility: CLINIC | Age: 65
End: 2025-04-15
Payer: MEDICARE

## 2025-04-15 DIAGNOSIS — H17.9 CORNEA OPACITY: Primary | ICD-10-CM

## 2025-04-15 DIAGNOSIS — H11.233 SYMBLEPHARON OF BOTH EYES: ICD-10-CM

## 2025-04-15 LAB
SIMK FLAT (OD): 44.7 DIOPTERS
SIMK FLAT (OS): 45 DIOPTERS
SIMK STEEP (OD): 44.8 DIOPTERS
SIMK STEEP (OS): 45.1 DIOPTERS

## 2025-04-15 PROCEDURE — 99204 OFFICE O/P NEW MOD 45 MIN: CPT | Performed by: OPHTHALMOLOGY

## 2025-04-15 PROCEDURE — 92025 CPTRIZED CORNEAL TOPOGRAPHY: CPT | Performed by: OPHTHALMOLOGY

## 2025-04-15 RX ORDER — TOBRAMYCIN AND DEXAMETHASONE 3; 1 MG/ML; MG/ML
SUSPENSION/ DROPS OPHTHALMIC
Qty: 5 ML | Refills: 0 | Status: SHIPPED | OUTPATIENT
Start: 2025-04-15

## 2025-04-15 ASSESSMENT — EXTERNAL EXAM - RIGHT EYE: OD_EXAM: NORMAL

## 2025-04-15 ASSESSMENT — SLIT LAMP EXAM - LIDS
COMMENTS: NORMAL
COMMENTS: NORMAL

## 2025-04-15 ASSESSMENT — EXTERNAL EXAM - LEFT EYE: OS_EXAM: NORMAL

## 2025-04-15 ASSESSMENT — VISUAL ACUITY
OD_CC: 20/20
OS_PH_CC+: -2
CORRECTION_TYPE: GLASSES
OS_PH_CC: 20/20
OS_CC: 20/40
METHOD: SNELLEN - LINEAR

## 2025-04-15 ASSESSMENT — TONOMETRY
OD_IOP_MMHG: 8
IOP_METHOD: TONOPEN
OS_IOP_MMHG: 12

## 2025-04-15 NOTE — LETTER
April 15, 2025    Radha Dorsey MD   [address]  [address]  [facility] [address]     Patient: Marie Ray   YOB: 1960   Date of Visit: 4/15/2025       Dear Dr. Radha Dorsey MD:    Thank you for referring Marie Ray to me for evaluation. 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 If you have questions, please do not hesitate to call me. I look forward to following Marie along with you.         Sincerely,        Camilla Chang MD        CC:   No Recipients        Base Eye Exam       Visual Acuity (Snellen - Linear)         Right Left    Dist cc 20/20 20/40    Dist ph cc  20/20 -2      Correction: Glasses              Tonometry (Tonopen, 3:17 PM)         Right Left    Pressure 8 12              Neuro/Psych       Oriented x3: Yes    Mood/Affect: Normal                  Slit Lamp and Fundus Exam       External Exam         Right Left    External Normal Normal              Slit Lamp Exam         Right Left    Lids/Lashes Normal Normal    Conjunctiva/Sclera Symblepharon, SC scarring Symblepharon, SC scarring    Cornea Multiple subepithelial infiltrates, not staining Multiple subepithelial infiltrates several in visual axis, not staining    Anterior Chamber Deep and quiet Deep and quiet    Iris Round and reactive Round and reactive    Lens 1+ NS 1+ NS    Anterior Vitreous Normal Normal

## 2025-04-15 NOTE — PROGRESS NOTES
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

## 2025-06-26 ENCOUNTER — APPOINTMENT (OUTPATIENT)
Dept: OPHTHALMOLOGY | Facility: CLINIC | Age: 65
End: 2025-06-26
Payer: COMMERCIAL

## 2025-07-17 ENCOUNTER — APPOINTMENT (OUTPATIENT)
Dept: OPHTHALMOLOGY | Facility: CLINIC | Age: 65
End: 2025-07-17
Payer: COMMERCIAL

## 2025-10-09 ENCOUNTER — APPOINTMENT (OUTPATIENT)
Dept: OPHTHALMOLOGY | Facility: CLINIC | Age: 65
End: 2025-10-09
Payer: COMMERCIAL